# Patient Record
Sex: FEMALE
[De-identification: names, ages, dates, MRNs, and addresses within clinical notes are randomized per-mention and may not be internally consistent; named-entity substitution may affect disease eponyms.]

---

## 2020-12-19 ENCOUNTER — NURSE TRIAGE (OUTPATIENT)
Dept: OTHER | Facility: CLINIC | Age: 75
End: 2020-12-19

## 2020-12-19 NOTE — TELEPHONE ENCOUNTER
Reason for Disposition   Systolic BP  >= 294 OR Diastolic >= 001    Answer Assessment - Initial Assessment Questions  1. BLOOD PRESSURE: \"What is the blood pressure? \" \"Did you take at least two measurements 5 minutes apart?\"      188/88 then after bp medication 184/85    Current bp - 194/95    2. ONSET: \"When did you take your blood pressure? \"      This morning - pt states she is usually SBP 140s    3. HOW: \"How did you obtain the blood pressure? \" (e.g., visiting nurse, automatic home BP monitor)      Home automatic cuff. Pt was prompted to take her bp because she felt \"out of sorts\"    4. HISTORY: \"Do you have a history of high blood pressure? \"      Yes    5. MEDICATIONS: Terrell Mullins you taking any medications for blood pressure? \" \"Have you missed any doses recently? \"      Yes, she has not missed any doses and is taking as rx    6. OTHER SYMPTOMS: \"Do you have any symptoms? \" (e.g., headache, chest pain, blurred vision, difficulty breathing, weakness)      Denies all listed and denies dizziness    7. PREGNANCY: \"Is there any chance you are pregnant? \" \"When was your last menstrual period? \"      Not asked    Protocols used: HIGH BLOOD PRESSURE-ADULT-AH    POD 5 MMO    Pt calls in reporting high blood pressure, assessment above. Pt advised of disposition and is agreeable with plan. Care advice provided. Pt denies further questions or needs at this time. Attention provider: Your patient utilized nurse triage services offered by employer, payer or community. This encounter includes an overview of the reason for call, assessment and recommended disposition. Please do not respond through this encounter as the response is not directed to a shared pool.